# Patient Record
Sex: FEMALE | Race: WHITE
[De-identification: names, ages, dates, MRNs, and addresses within clinical notes are randomized per-mention and may not be internally consistent; named-entity substitution may affect disease eponyms.]

---

## 2018-08-12 ENCOUNTER — HOSPITAL ENCOUNTER (EMERGENCY)
Dept: HOSPITAL 56 - MW.ED | Age: 23
Discharge: HOME | End: 2018-08-12
Payer: COMMERCIAL

## 2018-08-12 VITALS — SYSTOLIC BLOOD PRESSURE: 158 MMHG | DIASTOLIC BLOOD PRESSURE: 92 MMHG

## 2018-08-12 DIAGNOSIS — K04.7: Primary | ICD-10-CM

## 2018-08-12 PROCEDURE — 99282 EMERGENCY DEPT VISIT SF MDM: CPT

## 2018-08-12 NOTE — EDM.PDOC
ED HPI GENERAL MEDICAL PROBLEM





- General


Chief Complaint: ENT Problem


Stated Complaint: TOOTH PAIN


Time Seen by Provider: 08/12/18 17:07


Source of Information: Reports: Patient


History Limitations: Reports: No Limitations





- History of Present Illness


INITIAL COMMENTS - FREE TEXT/NARRATIVE: 


HISTORY AND PHYSICAL:





History of present illness:


patient is a 23-year-old female who presents to the emergency room with 

complaints of right upper dental pain. She states she has had chronic problems 

with the affected teeth and has been seen intermittently for pain management. 

She states she has made dentist appointment but has canceled them as usually 

her pain will resolve. Currently 13 weeks pregnant, has no current OB concerns.


she denies any fever, chills, chest pain, shortness of breath or cough. Denies 

any abdominal pain, nausea, vomiting, diarrhea or constipation.


Denies any chance of pregnancy.





Review of systems: 


As per history of present illness and below otherwise all systems reviewed and 

negative.





Past medical history: 


As per history of present illness and as reviewed below otherwise 

noncontributory.





Surgical history: 


As per history of present illness and as reviewed below otherwise 

noncontributory.





Social history: 


No reported history of drug or alcohol abuse.





Family history: 


As per history of present illness and as reviewed below otherwise 

noncontributory.





Physical exam:


General: Well-developed and well-nourished 23-year-old female. Alert and 

oriented. Nooxic appearing and in no acute distress.


HEENT: Atraumatic, normocephalic, pupils equal and reactive bilaterally, 

negative for conjunctival pallor or scleral icterus, mucous membranes moist, 

throat clear, neck supple, nontender, trachea midline. No drooling or trismus 

noted. No meningeal signs


Lungs: Clear to auscultation, breath sounds equal bilaterally, chest nontender.


Heart: S1S2, regular rate and rhythm without overt murmur


Abdomen: Soft, nondistended, nontender. Negative for masses or 

hepatosplenomegaly. Negative for costovertebral tenderness.


Pelvis: Stable nontender.


Genitourinary: Deferred.


Rectal: Deferred.


Skin: Intact, warm, dry. No lesions or rashes noted.


Extremities: Atraumatic, negative for cords or calf pain. Neurovascular 

unremarkable.


Neuro: Awake, alert, oriented. Cranial nerves II through XII unremarkable. 

Cerebellum unremarkable. Motor and sensory unremarkable throughout. Exam 

nonfocal.





Notes:


Discussed the importance of maintaining her dental appointments for further 

evaluation and management of her chronic dental pain. She voices understanding 

and is agreeable to plan of care. Denies any further questions at this time.





Diagnostics:


None





Therapeutics:


Dental Balls (Viscous Lidocaine/Hurricane Spray)





Prescription:


Clindamycin





Impression: 


Dental Abscess





Plan:


1. Please take the antibiotic as prescribed.


2. Tylenol as needed for pain management (only pain medication safe in pregnancy

). "Tooth Balls" have been given to you; apply along the gumline every 2-3 

hours as needed. Do not swallow these; external use only. 


3. Follow-up with a dentist for definitive care. Return to the ED as needed and 

as discussed.





Definitive disposition and diagnosis as appropriate pending reevaluation and 

review of above.





  ** Right Oral/Mouth


Pain Score (Numeric/FACES): 10





- Related Data


 Allergies











Allergy/AdvReac Type Severity Reaction Status Date / Time


 


amoxicillin [Amoxicillin] Allergy  Hives Verified 08/12/18 17:32


 


codeine Allergy  Nausea Verified 08/12/18 17:32


 


hydrocodone Allergy  Nausea Verified 08/12/18 17:32


 


Penicillins Allergy  Hives Verified 08/12/18 17:32











Home Meds: 


 Home Meds





Prenatal Vits #93/Iron Fum/FA [Prenatal Formula Tablet] 1 each PO DAILY 10/23/

15 [History]


Doxylamine/Pyridoxine HCl [Diclegis Dr 10-10 mg Tablet] 2 tab PO BEDTIME 08/12/ 18 [History]











Past Medical History





- Past Health History


Medical/Surgical History: Denies Medical/Surgical History





ED ROS ENT





- Review of Systems


Review Of Systems: ROS reveals no pertinent complaints other than HPI.





ED EXAM, ENT





- Physical Exam


Exam: See Below (See dictation)





Course





- Vital Signs


Last Recorded V/S: 


 Last Vital Signs











Temp  98.4 F   08/12/18 17:30


 


Pulse  104 H  08/12/18 17:30


 


Resp  18   08/12/18 17:30


 


BP  158/92 H  08/12/18 17:30


 


Pulse Ox  100   08/12/18 17:30














- Orders/Labs/Meds


Meds: 


Medications














Discontinued Medications














Generic Name Dose Route Start Last Admin





  Trade Name Freq  PRN Reason Stop Dose Admin


 


Benzocaine  2 each  08/12/18 17:10  08/12/18 17:43





  Hurricaine One 20%  MUCMEM  08/12/18 17:11  2 each





  ONETIME ONE   Administration





     





     





     





     


 


Lidocaine HCl  15 ml  08/12/18 17:10  08/12/18 17:43





  Xylocaine 2% Viscous  PO  08/12/18 17:11  15 ml





  ONETIME ONE   Administration





     





     





     





     














Departure





- Departure


Time of Disposition: 17:47


Disposition: Home, Self-Care 01


Clinical Impression: 


 Dental abscess








- Discharge Information


Instructions:  Dental Abscess, Easy-to-Read


Referrals: 


Alissa Desai NP [Primary Care Provider] - 


Forms:  ED Department Discharge


Additional Instructions: 


The following information is given to patients seen in the emergency department 

who are being discharged to home. This information is to outline your options 

for follow-up care. We provide all patients seen in our emergency department 

with a follow-up referral.





The need for follow-up, as well as the timing and circumstances, are variable 

depending upon the specifics of your emergency department visit.





If you don't have a primary care physician on staff, we will provide you with a 

referral. We always advise you to contact your personal physician following an 

emergency department visit to inform them of the circumstance of the visit and 

for follow-up with them and/or the need for any referrals to a consulting 

specialist.





The emergency department will also refer you to a specialist when appropriate. 

This referral assures that you have the opportunity for follow-up care with a 

specialist. All of these measure are taken in an effort to provide you with 

optimal care, which includes your follow-up.





Under all circumstances we always encourage you to contact your private 

physician who remains a resource for coordinating your care. When calling for 

follow-up care, please make the office aware that this follow-up is from your 

recent emergency room visit. If for any reason you are refused follow-up, 

please contact the [facility] Emergency 

Department at [phone] and asked to speak to the emergency department 

charge nurse.





[facility]


Primary Care


[address] [place] [id number]


Phone: [phone]


Fax: [phone]





1. Please take the antibiotic as prescribed.


2. Tylenol as needed for pain management (only pain medication safe in pregnancy

). "Tooth Balls" have been given to you; apply along the gumline every 2-3 

hours as needed. Do not swallow these; external use only. 


3. Follow-up with a dentist for definitive care. Return to the ED as needed and 

as discussed.

## 2019-02-01 ENCOUNTER — HOSPITAL ENCOUNTER (INPATIENT)
Dept: HOSPITAL 56 - MW.OB | Age: 24
LOS: 3 days | Discharge: HOME | DRG: 560 | End: 2019-02-04
Attending: OBSTETRICS & GYNECOLOGY | Admitting: OBSTETRICS & GYNECOLOGY
Payer: COMMERCIAL

## 2019-02-01 DIAGNOSIS — Z3A.38: ICD-10-CM

## 2019-02-01 DIAGNOSIS — F41.9: ICD-10-CM

## 2019-02-01 DIAGNOSIS — Z88.6: ICD-10-CM

## 2019-02-01 DIAGNOSIS — Z88.1: ICD-10-CM

## 2019-02-01 DIAGNOSIS — Z88.0: ICD-10-CM

## 2019-02-01 DIAGNOSIS — Z87.891: ICD-10-CM

## 2019-02-01 DIAGNOSIS — Z88.5: ICD-10-CM

## 2019-02-01 PROCEDURE — 3E0R3BZ INTRODUCTION OF ANESTHETIC AGENT INTO SPINAL CANAL, PERCUTANEOUS APPROACH: ICD-10-PCS

## 2019-02-01 PROCEDURE — 00HU33Z INSERTION OF INFUSION DEVICE INTO SPINAL CANAL, PERCUTANEOUS APPROACH: ICD-10-PCS

## 2019-02-01 RX ADMIN — MAGNESIUM SULFATE IN WATER SCH MLS/HR: 40 INJECTION, SOLUTION INTRAVENOUS at 19:00

## 2019-02-01 NOTE — PCM.PREANE
Preanesthetic Assessment





- Anesthesia/Transfusion/Family Hx


Anesthesia History: Prior Anesthesia Without Reaction (D+C x 2, neck lymph nodes

, labor epidural:  GA and epidural without issues or problems)


Other Type of Anesthesia Reaction Comment: MOTHER DENIES ANY HX OF ANESTHESIA 

PROBLEMS


Family History of Anesthesia Reaction: No


Transfusion History: No Prior Transfusion(s)





- Review of Systems


General: No Symptoms (pre eclampsia, on magnesium sulfate drip.   37.6 weeks

)


Pulmonary: No Symptoms (asthma with sheezing in the past. No history of asthma 

attacks.  Last wheezing )


Cardiovascular: No Symptoms (pre eclampsia)


Gastrointestinal: No Symptoms (nausea during pregnancy treated with Vit B6 pills

)


Neurological: No Symptoms (sciatica on right side on and off--none for 2 months 

now. Worse when sangeeta to  son)


Other: Reports: None





- Physical Assessment


NPO Status Date: 19


NPO Status Time: 12:00 (food at noon. water and ice since then)


Pulse: 97 (fht 127)


O2 Sat by Pulse Oximetry: 100


Respiratory Rate: 22


Blood Pressure: 133/83


Temperature: 97.7 C


Height: 1.52 m


Weight: 79.832 kg


ASA Class: 3E


Mental Status: Alert & Oriented x3


Airway Class: Mallampati = 2


Dentition: Reports: Normal Dentition


Thyro-Mental Finger Breadths: 2


Mouth Opening Finger Breadths: 3 (tongue stud)


ROM/Head Extension: Full


Lungs: Clear to Auscultation, Normal Respiratory Effort


Cardiovascular: Regular Rate, Regular Rhythm





- Lab


Values: 





 Laboratory Last Values











WBC  13.21 K/uL (4.0-11.0)  H  19  18:45    


 


RBC  4.22 M/uL (4.30-5.90)  L  19  18:45    


 


Hgb  11.1 g/dL (12.0-16.0)  L  19  18:45    


 


Hct  34.7 % (36.0-46.0)  L  19  18:45    


 


MCV  82.2 fL (80.0-98.0)   19  18:45    


 


MCH  26.3 pg (27.0-32.0)  L  19  18:45    


 


MCHC  32.0 g/dL (31.0-37.0)   19  18:45    


 


RDW Std Deviation  47.1 fl (28.0-62.0)   19  18:45    


 


RDW Coeff of Rigoberto  16 % (11.0-15.0)  H  19  18:45    


 


Plt Count  237 K/uL (150-400)   19  18:45    


 


MPV  10.40 fL (7.40-12.00)   19  18:45    


 


Nucleated RBC %  0.0 /100WBC  19  18:45    


 


Nucleated RBCs #  0 K/uL  19  18:45    


 


Magnesium  2.0 mg/dL (1.8-2.4)   19  18:45    


 


Blood Type  B NEGATIVE   19  18:45    


 


Antibody Screen  NEGATIVE   19  18:45    














- Allergies


Allergies/Adverse Reactions: 


 Allergies











Allergy/AdvReac Type Severity Reaction Status Date / Time


 


amoxicillin [Amoxicillin] Allergy  Hives Verified 19 22:49


 


codeine Allergy  Nausea Verified 19 22:49


 


hydrocodone Allergy  Nausea Verified 19 22:49


 


Penicillins Allergy  Hives Verified 19 22:49














- Blood


Blood Available: No


Product(s) Available: None





- Anesthesia Plan


Pre-Op Medication Ordered: None





- Acknowledgements


Anesthesia Type Planned: Epidural (Plan:  labor epidural. Discussed with 

patient and baby daddy and mom and dad and grandma and grandpa.  all questions 

answered. consent signed)


Pt an Appropriate Candidate for the Planned Anesthesia: Yes


Alternatives and Risks of Anesthesia Discussed w Pt/Guardian: Yes


Pt/Guardian Understands and Agrees with Anesthesia Plan: Yes





PreAnesthesia Questionnaire





- Past Health History


Medical/Surgical History: Denies Medical/Surgical History


OB/GYN History: Reports: Pregnancy, Spontaneous , Other (See Below)


Other OB/BYN History: D&C 2014 and 2017


Psychiatric History: Reports: Anxiety





- Past Surgical History


HEENT Surgical History: Reports: Other (See Below)


Other HEENT Surgeries/Procedures: Neck Surgery 2011


Female  Surgical History: Reports: D&C





- SUBSTANCE USE


Smoking Status *Q: Former Smoker


Tobacco Use Within Last Twelve Months: Cigarettes


Second Hand Smoke Exposure: Yes


Recreational Drug Use History: No





- HOME MEDS


Home Medications: 


 Home Meds





Prenatal Vits #93/Iron Fum/FA [Prenatal Formula Tablet] 1 each PO DAILY 10/23/

15 [History]


Doxylamine/Pyridoxine HCl [Diclegis Dr 10-10 mg Tablet] 2 tab PO BEDTIME  [History]











- CURRENT (IN HOUSE) MEDS


Current Meds: 





 Current Medications





Butorphanol Tartrate (Stadol)  1 mg IVPUSH ASDIRECTED PRN


   PRN Reason: Pain


Calcium Gluconate (Calcium Gluconate)  1 gm IV ASDIRECTED PRN


   PRN Reason: respiratory distress


Carboprost Tromethamine (Hemabate Ds)  250 mcg IM ASDIRECTED PRN


   PRN Reason: Post Partum Hemorrhage


Lactated Ringer's (Ringers, Lactated)  1,000 mls @ 150 mls/hr IV ASDIRECTED CHRISTA


   Last Admin: 19 19:20 Dose:  150 mls/hr


Magnesium Sulfate (Magnesium Sulfate 40 Gm In Water 1000 Ml)  40 gm in 1,000 

mls @ 50 mls/hr IV ASDIRECTED CHRISTA; Protocol


Oxytocin/Sodium Chloride (Oxytocin 30 Unit/500 Ml-Ns)  30 unit in 500 mls @ 2 

mls/hr IV TITRATE CHRISTA; Protocol


   Last Titration: 19 21:39 Dose:  6 munits/min, 6 mls/hr


Oxytocin/Sodium Chloride (Oxytocin 30 Unit/500 Ml-Ns)  30 unit in 500 mls @ 999 

mls/hr IV TITRATE CHRISTA


Tranexamic Acid 1,000 mg/ (Sodium Chloride)  110 mls @ 660 mls/hr IV ONETIME PRN


   PRN Reason: Bleeding


Lidocaine HCl (Xylocaine 1%)  50 ml INJECT ONETIME PRN


   PRN Reason: Laceration repair


Methylergonovine Maleate (Methergine)  0.2 mg IM ASDIRECTED PRN


   PRN Reason: Post Partum Hemorrhage


Misoprostol (Cytotec)  200 mcg PO ONETIME PRN


   PRN Reason: Post Partum Hemorrhage


Nalbuphine HCl (Nubain)  10 mg IVPUSH ASDIRECTED PRN


   PRN Reason: Pain (severe 7-10)


Pyridoxine HCl (Vitamin B6-Pyridoxine)  25 mg PO BEDTIME CHRITSA


Sterile Water (Sterile Water For Irrigation)  1,000 ml IRR ASDIRECTED PRN


   PRN Reason: delivery


Terbutaline Sulfate (Brethine)  0.25 mg SUBCUT ASDIRECTED PRN


   PRN Reason: Tacysystole





Discontinued Medications





Magnesium Sulfate 4 gm/ Premix  100 mls @ 300 mls/hr IV BOLUS ONE


   Stop: 19 18:15


   Last Admin: 19 18:40 Dose:  300 mls/hr


Fentanyl/Bupivacaine HCl (Fentanyl-Bupiv-Ns 2 Mcg/Ml-0.125%) Confirm 

Administered Dose 100 mls @ as directed .ROUTE .STK-MED ONE


   Stop: 19 21:10


Lidocaine/Epinephrine (Lidocaine 1.5%-Epi 1:200,000) Confirm Administered Dose 

10 ml IJ .STK-MED ONE


   Stop: 19 21:11

## 2019-02-02 PROCEDURE — 6A550ZT PHERESIS OF CORD BLOOD STEM CELLS, SINGLE: ICD-10-PCS | Performed by: OBSTETRICS & GYNECOLOGY

## 2019-02-02 PROCEDURE — 10907ZC DRAINAGE OF AMNIOTIC FLUID, THERAPEUTIC FROM PRODUCTS OF CONCEPTION, VIA NATURAL OR ARTIFICIAL OPENING: ICD-10-PCS | Performed by: OBSTETRICS & GYNECOLOGY

## 2019-02-02 PROCEDURE — 3E033VJ INTRODUCTION OF OTHER HORMONE INTO PERIPHERAL VEIN, PERCUTANEOUS APPROACH: ICD-10-PCS | Performed by: OBSTETRICS & GYNECOLOGY

## 2019-02-02 RX ADMIN — MAGNESIUM SULFATE IN WATER SCH MLS/HR: 40 INJECTION, SOLUTION INTRAVENOUS at 22:06

## 2019-02-02 NOTE — [id number]
Post Anesthesia Note





- EVALUATION WITHIN 48HRS OF ANESTHETIC


Vital Signs in Normal Range: Yes


Patient Participated in Evaluation: Yes


Respiratory Function Stable: Yes


Airway Patent: Yes


Cardiovascular Function Stable: Yes


Hydration Status Stable: Yes


Pain Control Satisfactory: Yes


Nausea and Vomiting Control Satisfactory: Yes


Mental Status Recovered: Yes


Pulse Rate: 97 (fht 127)


Resp Rate: 20


Temperature: 97.7 C


Blood Pressure: 137/85





- COMMENTS/OBSERVATIONS


Free Text/Narrative:: 





delivered earlier this morning.  I was called in at 0600 to change the epidural 

infusion bag.  I subsequently re-bolused her for the delivery an hour later.


KERI Michel MD

## 2019-02-02 NOTE — PCM.DEL
L & D Note





- General Info


Date of Service: 19


Mother's Due Date: 19





- Delivery Note


Labor: Augmented by ARM, Induced by Oxytocin


Delivery Outcome: Livebirth


Infant Delivery Method: Spontaneous Vaginal Delivery-Single


Nuchal Cord: Present


Prep: Other


Anesthesia Type: Epidural


Episiotomy Type: None


Laceration: None


Placenta: Intact, Spontaneous


Cord: 3 Vessels


Estimated Blood Loss: 200


Resuscitation Needed: No


APGAR Score 1 min: 7


APGAR Score 5 min: 9


Second Stage Interventions: Reports: Encouragement Given, Pushing, Feet in Foot 

Rests (Liveborn male APGAR  weight 3030 grams. )





- General Info


Date of Service: 19





- Patient Data


Vitals - Most Recent: 


 Last Vital Signs











Temp  97.7 C H  19 22:08


 


Pulse  97   19 22:08


 


Resp  22 H  19 22:08


 


BP  133/83   19 22:08


 


Pulse Ox  100   19 22:08











Weight - Most Recent: 79.832 kg


I&O - Last 24 Hours: 


 Intake & Output











 19





 22:59 06:59 14:59


 


Intake Total 1140 706 140


 


Output Total 420 245 140


 


Balance 720 461 0











Lab Results Last 24 Hours: 


 Laboratory Results - last 24 hr











  19 Range/Units





  18:45 18:45 18:45 


 


WBC  13.21 H    (4.0-11.0)  K/uL


 


RBC  4.22 L    (4.30-5.90)  M/uL


 


Hgb  11.1 L    (12.0-16.0)  g/dL


 


Hct  34.7 L    (36.0-46.0)  %


 


MCV  82.2    (80.0-98.0)  fL


 


MCH  26.3 L    (27.0-32.0)  pg


 


MCHC  32.0    (31.0-37.0)  g/dL


 


RDW Std Deviation  47.1    (28.0-62.0)  fl


 


RDW Coeff of Rigoberto  16 H    (11.0-15.0)  %


 


Plt Count  237    (150-400)  K/uL


 


MPV  10.40    (7.40-12.00)  fL


 


Nucleated RBC %  0.0    /100WBC


 


Nucleated RBCs #  0    K/uL


 


Magnesium    2.0  (1.8-2.4)  mg/dL


 


Blood Type   B NEGATIVE   


 


Antibody Screen   NEGATIVE   














  19 Range/Units





  23:07 05:57 


 


WBC    (4.0-11.0)  K/uL


 


RBC    (4.30-5.90)  M/uL


 


Hgb    (12.0-16.0)  g/dL


 


Hct    (36.0-46.0)  %


 


MCV    (80.0-98.0)  fL


 


MCH    (27.0-32.0)  pg


 


MCHC    (31.0-37.0)  g/dL


 


RDW Std Deviation    (28.0-62.0)  fl


 


RDW Coeff of Rigoberto    (11.0-15.0)  %


 


Plt Count    (150-400)  K/uL


 


MPV    (7.40-12.00)  fL


 


Nucleated RBC %    /100WBC


 


Nucleated RBCs #    K/uL


 


Magnesium  5.1 H  6.4 H  (1.8-2.4)  mg/dL


 


Blood Type    


 


Antibody Screen    











Med Orders - Current: 


 Current Medications





Butorphanol Tartrate (Stadol)  1 mg IVPUSH ASDIRECTED PRN


   PRN Reason: Pain


Calcium Gluconate (Calcium Gluconate)  1 gm IV ASDIRECTED PRN


   PRN Reason: respiratory distress


Carboprost Tromethamine (Hemabate Ds)  250 mcg IM ASDIRECTED PRN


   PRN Reason: Post Partum Hemorrhage


Lactated Ringer's (Ringers, Lactated)  1,000 mls @ 150 mls/hr IV ASDIRECTED CHRISTA


   Last Admin: 19 19:20 Dose:  150 mls/hr


Magnesium Sulfate (Magnesium Sulfate 40 Gm In Water 1000 Ml)  40 gm in 1,000 

mls @ 50 mls/hr IV ASDIRECTED CHRISTA; Protocol


   Last Titration: 19 07:13 Dose:  1 gm/hr, 25 mls/hr


Oxytocin/Sodium Chloride (Oxytocin 30 Unit/500 Ml-Ns)  30 unit in 500 mls @ 2 

mls/hr IV TITRATE CHRISTA; Protocol


   Last Titration: 19 07:32 Dose:  11 munits/min, 11 mls/hr


Oxytocin/Sodium Chloride (Oxytocin 30 Unit/500 Ml-Ns)  30 unit in 500 mls @ 999 

mls/hr IV TITRATE CHRISTA


Tranexamic Acid 1,000 mg/ (Sodium Chloride)  110 mls @ 660 mls/hr IV ONETIME PRN


   PRN Reason: Bleeding


Lidocaine HCl (Xylocaine 1%)  50 ml INJECT ONETIME PRN


   PRN Reason: Laceration repair


Methylergonovine Maleate (Methergine)  0.2 mg IM ASDIRECTED PRN


   PRN Reason: Post Partum Hemorrhage


Misoprostol (Cytotec)  200 mcg PO ONETIME PRN


   PRN Reason: Post Partum Hemorrhage


Nalbuphine HCl (Nubain)  10 mg IVPUSH ASDIRECTED PRN


   PRN Reason: Pain (severe 7-10)


Ondansetron HCl (Zofran)  4 mg IVPUSH Q6H PRN


   PRN Reason: Nausea/Vomiting


   Last Admin: 19 00:37 Dose:  4 mg


Pyridoxine HCl (Vitamin B6-Pyridoxine)  25 mg PO BEDTIME CHRISTA


   Last Admin: 19 22:38 Dose:  25 mg


Sterile Water (Sterile Water For Irrigation)  1,000 ml IRR ASDIRECTED PRN


   PRN Reason: delivery


Terbutaline Sulfate (Brethine)  0.25 mg SUBCUT ASDIRECTED PRN


   PRN Reason: Tacysystole





Discontinued Medications





Magnesium Sulfate 4 gm/ Premix  100 mls @ 300 mls/hr IV BOLUS ONE


   Stop: 19 18:15


   Last Admin: 19 18:40 Dose:  300 mls/hr


Fentanyl/Bupivacaine HCl (Fentanyl-Bupiv-Ns 2 Mcg/Ml-0.125%) Confirm 

Administered Dose 100 mls @ as directed .ROUTE [employer]MED ONE


   Stop: 19 21:10


Lidocaine HCl (Xylocaine-Mpf 1%) Confirm Administered Dose 5 mls @ as directed 

.ROUTE .STK-MED ONE


   Stop: 19 05:59


Fentanyl/Bupivacaine HCl (Fentanyl-Bupiv-Ns 2 Mcg/Ml-0.125%) Confirm 

Administered Dose 100 mls @ as directed .ROUTE .STK-MED ONE


   Stop: 19 06:03


Lidocaine/Epinephrine (Lidocaine 1.5%-Epi 1:200,000) Confirm Administered Dose 

10 ml IJ .[employer]-MED ONE


   Stop: 19 21:11











- Problem List & Annotations


(1) Pre-eclampsia, delivered


SNOMED Code(s): 075028100, 169041025


   Code(s): O14.94 - UNSPECIFIED PRE-ECLAMPSIA, COMPLICATING CHILDBIRTH   Status

: Acute   Current Visit: Yes   





(2) Vaginal delivery


SNOMED Code(s): 332727842


   Code(s): O80 - ENCOUNTER FOR FULL-TERM UNCOMPLICATED DELIVERY   Status: 

Acute   Current Visit: Yes   





- Problem List Review


Problem List Initiated/Reviewed/Updated: Yes





- My Orders


Last 24 Hours: 


My Active Orders





19 17:56


Butorphanol [Stadol]   1 mg IVPUSH ASDIRECTED PRN 


Calcium Gluconate   1 gm IV ASDIRECTED PRN 


Carboprost Tromethamine [Hemabate DS]   250 mcg IM ASDIRECTED PRN 


Lidocaine 1% [Xylocaine 1%]   50 ml INJECT ONETIME PRN 


Methylergonovine [Methergine]   0.2 mg IM ASDIRECTED PRN 


Nalbuphine [Nubain]   10 mg IVPUSH ASDIRECTED PRN 


Terbutaline [Brethine]   0.25 mg SUBCUT ASDIRECTED PRN 


Tranexamic Acid [Cyklokapron] 1,000 mg   Sodium Chloride 0.9% [Normal Saline] 

100 ml IV ONETIME 


Water For Irrigation,Sterile [Sterile Water for Irrigation]   1,000 ml IRR 

ASDIRECTED PRN 


miSOPROStol [Cytotec]   200 mcg PO ONETIME PRN 


Resuscitation Status Routine 





19 18:00


Lactated Ringers [Ringers, Lactated] 1,000 ml IV ASDIRECTED 


Magnesium Sulfate/Water [Magnesium Sulfate 40 GM in Water 1000 ML] 40 gm in 1,

000 ml IV ASDIRECTED 


Oxytocin/0.9 % Sodium Chloride [Oxytocin 30 Unit/500 ML-NS] 30 unit in 500 ml 

IV TITRATE 


Oxytocin/0.9 % Sodium Chloride [Oxytocin 30 Unit/500 ML-NS] 30 unit in 500 ml 

IV TITRATE 





19 18:06


Patient Status [ADT] Routine 


Bedrest Bathroom Privileges [RC] ASDIRECTED 


Bedrest [RC] ASDIRECTED 


Communication Order [RC] ASDIRECTED 


Communication Order [RC] ASDIRECTED 


Communication Order [RC] PRN 


Communication Order [RC] PRN 


Height and Weight [RC] DAILY 


Intake and Output [RC] QSHIFT 


Notify Provider [RC] PRN 


Notify Provider [RC] STAT 


Oxygen Therapy [RC] ASDIRECTED 


Vaginal Exam [RC] PRN 


Vital Signs [RC] ASDIRECTED 


Vital Signs [RC] PER UNIT ROUTINE 


Electronic Fetal Heart Tones Ext w TOCO [WOMSER] Per Unit Routine 


Fetal Scalp Electrode [WOMSER] Per Unit Routine 


Peripheral IV Insertion Adult [OM.PC] Routine 


Peripheral IV Insertion Adult [OM.PC] Routine 





19 18:08


Equipment to Bedside [RC] PRN 


Notify Provider Status Change [RC] ASDIRECTED 





19 18:15


Deep Tendon Reflexes [WOMSER] Q1H 


Medication Administration Instruction [OM.PC] Q3H 





19 19:15


Deep Tendon Reflexes [WOMSER] Q1H 





19 20:15


Deep Tendon Reflexes [WOMSER] Q1H 





19 21:00


Vitamin B6-pyridOXINE   25 mg PO BEDTIME 





19 21:15


Deep Tendon Reflexes [WOMSER] Q1H 





19 22:15


Deep Tendon Reflexes [WOMSER] Q1H 





19 23:15


Deep Tendon Reflexes [WOMSER] Q1H 





19 00:15


Deep Tendon Reflexes [WOMSER] Q1H 





19 00:25


Ondansetron [Zofran]   4 mg IVPUSH Q6H PRN 





19 01:15


Deep Tendon Reflexes [WOMSER] Q1H 





19 02:15


Deep Tendon Reflexes [WOMSER] Q1 





19 03:15


Deep Tendon Reflexes [WOMSER] Q1 





19 04:15


Deep Tendon Reflexes [WOMSER] Q1 





19 05:15


Deep Tendon Reflexes [WOMSER] Q1 





19 06:15


Deep Tendon Reflexes [WOMSER] Q1 





19 07:15


Deep Tendon Reflexes [WOMSER] Q1 





19 08:15


Deep Tendon Reflexes [WOMSER] Q1 





19 09:15


Deep Tendon Reflexes [WOMSER] Q1 





19 10:15


Deep Tendon Reflexes [WOMSER] Q1 





19 11:15


Deep Tendon Reflexes [WOMSER] Q1H 





19 11:45


MAGNESIUM [CHEM] Q6H 





19 12:15


Deep Tendon Reflexes [WOMSER] Q1H 





19 13:15


Deep Tendon Reflexes [WOMSER] Q1H 





19 14:15


Deep Tendon Reflexes [WOMSER] Q1H 





19 15:15


Deep Tendon Reflexes [WOMSER] Q1H 





19 16:15


Deep Tendon Reflexes [WOMSER] Q1 





19 17:15


Deep Tendon Reflexes [WOMSER] Q1H 





19 17:45


MAGNESIUM [CHEM] Q6H 





19 23:45


MAGNESIUM [CHEM] Q6H 





19 05:45


MAGNESIUM [CHEM] Q6H

## 2019-02-03 NOTE — PCM.PNPP
- General Info


Date of Service: 19


Functional Status: Reports: Pain Controlled, Tolerating Diet, Ambulating, 

Urinating





- Review of Systems


General: Reports: No Symptoms


HEENT: Reports: No Symptoms


Pulmonary: Reports: No Symptoms


Cardiovascular: Reports: No Symptoms


Gastrointestinal: Reports: No Symptoms


Genitourinary: Reports: No Symptoms


Musculoskeletal: Reports: No Symptoms


Skin: Reports: No Symptoms


Neurological: Reports: No Symptoms


Psychiatric: Reports: No Symptoms





- General Info


Date of Service: 19





- Patient Data


Vital Signs - Most Recent: 


 Last Vital Signs











Temp  36.6 C   19 04:50


 


Pulse  86   19 04:50


 


Resp  16   19 04:50


 


BP  122/64   19 04:50


 


Pulse Ox  97   19 04:50











Weight - Most Recent: 79.832 kg


I&O - Last 24 Hours: 


 Intake & Output











 19





 22:59 06:59 14:59


 


Intake Total 280 506 


 


Output Total 717 675 


 


Balance -437 -169 











Lab Results - Last 24 Hours: 


 Laboratory Results - last 24 hr











  19 Range/Units





  07:53 10:31 11:58 


 


Hgb     (12.0-16.0)  g/dL


 


Hct     (36.0-46.0)  %


 


Cord ABG pH  7.314    (7.18-7.38)  


 


Cord ABG Base Excess  -6    (-10--2)  


 


Cord VBG pH  7.397    (7.25-7.45)  


 


Cord VBG Base Excess  -6    (-10--2)  


 


Magnesium    5.0 H  (1.8-2.4)  mg/dL


 


Fetal Screen   NEGATIVE   (NEGATIVE)  


 


RhIG Candidate?   YES   


 


Rhogam Indicated   YES, BABY RH POS H   














  19 Range/Units





  17:43 05:54 


 


Hgb   9.9 L  (12.0-16.0)  g/dL


 


Hct   31.6 L  (36.0-46.0)  %


 


Cord ABG pH    (7.18-7.38)  


 


Cord ABG Base Excess    (-10--2)  


 


Cord VBG pH    (7.25-7.45)  


 


Cord VBG Base Excess    (-10--2)  


 


Magnesium  5.1 H   (1.8-2.4)  mg/dL


 


Fetal Screen    (NEGATIVE)  


 


RhIG Candidate?    


 


Rhogam Indicated    











Med Orders - Current: 


 Current Medications





Acetaminophen (Tylenol Extra Strength)  500 mg PO Q4H PRN


   PRN Reason: Pain


Acetaminophen (Tylenol Extra Strength)  1,000 mg PO Q4H PRN


   PRN Reason: Pain


   Last Admin: 19 02:48 Dose:  1,000 mg


Benzocaine/Menthol (Dermoplast Pain Relief 20%-0.5% Spray)  78 gm TOP 

ASDIRECTED PRN


   PRN Reason: Perineal Comfort Measure


Bisacodyl (Dulcolax)  10 mg RECTAL ONETIME PRN


   PRN Reason: Constipation


Butorphanol Tartrate (Stadol)  1 mg IVPUSH ASDIRECTED PRN


   PRN Reason: Pain


Carboprost Tromethamine (Hemabate Ds)  250 mcg IM ASDIRECTED PRN


   PRN Reason: Excessive vaginal bleeding


Docusate Sodium (Colace)  100 mg PO BID PRN


   PRN Reason: Constipation


   Last Admin: 19 09:28 Dose:  100 mg


Emollient Ointment (Lansinoh Hpa)  0 gm TOP ASDIRECTED PRN


   PRN Reason: Sore Nipples


   Last Admin: 19 00:01 Dose:  1 tube


Lactated Ringer's (Ringers, Lactated)  1,000 mls @ 150 mls/hr IV ASDIRECTED CHRISTA


   Last Admin: 19 19:20 Dose:  150 mls/hr


Ibuprofen (Motrin)  400 mg PO Q4H PRN


   PRN Reason: Pain


   Last Admin: 19 05:04 Dose:  400 mg


Witch Hazel (Tucks)  1 pad TOP ASDIRECTED PRN


   PRN Reason: comfort care





Discontinued Medications





Calcium Gluconate (Calcium Gluconate)  1 gm IV ASDIRECTED PRN


   PRN Reason: respiratory distress


Carboprost Tromethamine (Hemabate Ds)  250 mcg IM ASDIRECTED PRN


   PRN Reason: Post Partum Hemorrhage


Magnesium Sulfate 4 gm/ Premix  100 mls @ 300 mls/hr IV BOLUS ONE


   Stop: 19 18:15


   Last Admin: 19 18:40 Dose:  300 mls/hr


Magnesium Sulfate (Magnesium Sulfate 40 Gm In Water 1000 Ml)  40 gm in 1,000 

mls @ 50 mls/hr IV ASDIRECTED CHRISTA; Protocol


   Last Admin: 19 22:06 Dose:  1 gm/hr, 25 mls/hr


Oxytocin/Sodium Chloride (Oxytocin 30 Unit/500 Ml-Ns)  30 unit in 500 mls @ 2 

mls/hr IV TITRATE CHRISTA; Protocol


   Last Titration: 19 07:32 Dose:  11 munits/min, 11 mls/hr


Oxytocin/Sodium Chloride (Oxytocin 30 Unit/500 Ml-Ns)  30 unit in 500 mls @ 999 

mls/hr IV TITRATE CHRISTA


Tranexamic Acid 1,000 mg/ (Sodium Chloride)  110 mls @ 660 mls/hr IV ONETIME PRN


   PRN Reason: Bleeding


Fentanyl/Bupivacaine HCl (Fentanyl-Bupiv-Ns 2 Mcg/Ml-0.125%) Confirm 

Administered Dose 100 mls @ as directed .ROUTE .[employer]-MED ONE


   Stop: 19 21:10


   Last Admin: 19 21:38 Dose:  Not Given


Lidocaine HCl (Xylocaine-Mpf 1%) Confirm Administered Dose 5 mls @ as directed 

.ROUTE .[employer]-MED ONE


   Stop: 19 05:59


Fentanyl/Bupivacaine HCl (Fentanyl-Bupiv-Ns 2 Mcg/Ml-0.125%) Confirm 

Administered Dose 100 mls @ as directed .ROUTE .[employer]


   Stop: 19 06:03


   Last Admin: 19 21:38 Dose:  Not Given


Lidocaine HCl (Xylocaine 1%)  50 ml INJECT ONETIME PRN


   PRN Reason: Laceration repair


Lidocaine/Epinephrine (Lidocaine 1.5%-Epi 1:200,000) Confirm Administered Dose 

10 ml IJ .[email or web address]MED ONE


   Stop: 19 21:11


   Last Admin: 19 21:38 Dose:  Not Given


Methylergonovine Maleate (Methergine)  0.2 mg IM ASDIRECTED PRN


   PRN Reason: Post Partum Hemorrhage


Misoprostol (Cytotec)  200 mcg PO ONETIME PRN


   PRN Reason: Post Partum Hemorrhage


Nalbuphine HCl (Nubain)  10 mg IVPUSH ASDIRECTED PRN


   PRN Reason: Pain (severe 7-10)


Ondansetron HCl (Zofran)  4 mg IVPUSH Q6H PRN


   PRN Reason: Nausea/Vomiting


   Last Admin: 19 00:37 Dose:  4 mg


Pyridoxine HCl (Vitamin B6-Pyridoxine)  25 mg PO BEDTIME CHRISTA


   Last Admin: 19 22:38 Dose:  25 mg


Sterile Water (Sterile Water For Irrigation)  1,000 ml IRR ASDIRECTED PRN


   PRN Reason: delivery


Terbutaline Sulfate (Brethine)  0.25 mg SUBCUT ASDIRECTED PRN


   PRN Reason: Tacysystole











- Infant Interaction


Infant Disposition, Postpartum:  in Room with Family


Infant Interaction: Holding Infant


Infant Feeding: Attempted Breastfeeding; Nursed Fair/Poor


Support Person: Significant Other





- Postpartum Recovery Exam


Fundal Tone: Boggy


Fundal Level: 1 Fingerbreadths Below Umbilicus


Fundal Placement: Midline


Lochia Amount: Scant


Lochia Color: Rubra/Red


Perineum Description: Intact, Minimal Bruising/Swelling


Episiotomy/Laceration: None


Bladder Status: Voiding


Urinary Elimination: Voided





- Exam


General: Alert, Oriented


Cardiovascular: Regular Rate, Regular Rhythm


GI/Abdominal Exam: Normal Bowel Sounds, Soft, Non-Tender, No Organomegaly, No 

Distention, No Abnormal Bruit, No Mass, Pelvis Stable


Skin: Warm, Dry, Intact


Neurological: No New Focal Deficit


Psy/Mental Status: Alert, Normal Affect, Normal Mood





- Problem List & Annotations


(1) Pre-eclampsia, delivered


SNOMED Code(s): 158453354, 208499692


   Code(s): O14.94 - UNSPECIFIED PRE-ECLAMPSIA, COMPLICATING CHILDBIRTH   Status

: Acute   Current Visit: Yes   





(2) Vaginal delivery


SNOMED Code(s): 658056330


   Code(s): O80 - ENCOUNTER FOR FULL-TERM UNCOMPLICATED DELIVERY   Status: 

Acute   Current Visit: Yes   





- Problem List Review


Problem List Initiated/Reviewed/Updated: Yes





- My Orders


Last 24 Hours: 


My Active Orders





19 08:15


Patient Status [ADT] Routine 


May Shower [RC] ASDIRECTED 


Up ad Lamar [RC] ASDIRECTED 


Vital Signs [RC] PER UNIT ROUTINE 


Acetaminophen [Tylenol Extra Strength]   1,000 mg PO Q4H PRN 


Acetaminophen [Tylenol Extra Strength]   500 mg PO Q4H PRN 


Benzocaine/Menthol [Dermoplast Pain Relief 20%-0.5% Spray]   78 gm TOP 

ASDIRECTED PRN 


Bisacodyl [Dulcolax]   10 mg RECTAL ONETIME PRN 


Carboprost Tromethamine [Hemabate DS]   250 mcg IM ASDIRECTED PRN 


Docusate Sodium [Colace]   100 mg PO BID PRN 


Ibuprofen [Motrin]   400 mg PO Q4H PRN 


Lanolin [Lansinoh HPA]   See Dose Instructions  TOP ASDIRECTED PRN 


Witch Hazel [Tucks]   1 pad TOP ASDIRECTED PRN 


Assess Lochia [WOMSER] Per Unit Routine 


Assess Uterine Involution [WOMSER] Per Unit Routine 


Peripheral IV Discontinue [OM.PC] Routine 


Resuscitation Status Routine 





19 08:16


Perineal Care [OM.PC] Per Unit Routine 





19 Breakfast


Regular Diet [DIET] 














- Assessment


Assessment:: 


PPD#1 after  stable minimal lochia. Denies headache or blurred vision, 

Feeling well off of magnesium.








- Plan


Plan:: 


Bp's have been normal, continue with postpartum care, continue monitoring BP 

today, patient would like to stay until tomorrow if possible.

## 2019-02-04 VITALS — DIASTOLIC BLOOD PRESSURE: 79 MMHG | SYSTOLIC BLOOD PRESSURE: 127 MMHG

## 2019-02-04 NOTE — OR
SURGEON:

Christie Saldana M.D.

 

DATE OF DELIVERY:  2019

 

PREOPERATIVE DIAGNOSES:

1. A 38-week intrauterine pregnancy.

2. Mild preeclampsia.

 

POSTOPERATIVE DIAGNOSES:

1. A 38-week intrauterine pregnancy.

2. Mild preeclampsia.

 

PROCEDURES:

Magnesium seizure prophylaxis, Pitocin induction of labor, artificial rupture of

membranes, and term spontaneous vaginal delivery.

 

ANESTHESIA:

Epidural.

 

ESTIMATED BLOOD LOSS:

Less than 200 mL.

 

FINDINGS:

Liveborn male, Apgar scores 7 and 9, weighing 3030 g.  Placenta spontaneous,

Schultze intact with 3 vessels.  Perineum intact.

 

BRIEF HISTORY:

This is a 23-year-old female.  She is G3, P1-0-1-1.  She presents at 37 and 6/7

weeks' gestation for induction of labor due to elevated 24-hour urine of 364 mg,

mildly elevated blood pressures.  She denies headache or visual changes.  She

was placed on magnesium.  She was placed on Pitocin.  She had category 1 fetal

heart tones.  When she was 3 cm dilated, she had artificial rupture of

membranes.  Clear fluid noted.  She received an epidural for pain control.  She

was continued on Pitocin and magnesium until she progressed to complete.

 

DESCRIPTION OF PROCEDURE:

With the patient in dorsolithotomy position, the patient pushed over 30-minute

time period to a 5+ station.  At which time, the head was delivered

spontaneously and atraumatically over the perineum with support with subsequent

delivery of the infant's shoulders and body without any difficulty.  The infant

was bulb suctioned by nose and mouth.  Cord was clamped x2 and cut, and the

infant was handed to the mother in the presence of the nurse attending delivery.

The infant was a liveborn male, Apgar scores 7 and 9, weight of 3030 g.  Cord

blood was collected for cord ABGs, as well as routine cord blood sampling.

Pitocin was initiated after delivery of the infant to assist with delivery.  The

placenta, which was delivered spontaneously, Schultze intact with 3 vessels.

Upon inspection of the pelvis and perineum, there were no periurethral, vaginal

sidewall, cervical, rectal, or perineal lacerations.  EBL was less than 200 mL.

There were no known complications.  Mother and baby are in LDRP in good

condition.  She will be continued on magnesium seizure prophylaxis

postoperatively.

 

 

ALLAN / YULIANA

DD:  2019 08:39:25

DT:  2019 09:11:04

Job #:  [id number]

## 2019-02-04 NOTE — PCM.PNPP
- General Info


Date of Service: 19


Functional Status: Reports: Pain Controlled, Tolerating Diet, Ambulating, 

Urinating





- Review of Systems


General: Reports: No Symptoms


HEENT: Reports: No Symptoms


Pulmonary: Reports: No Symptoms


Cardiovascular: Reports: No Symptoms


Gastrointestinal: Reports: No Symptoms


Genitourinary: Reports: No Symptoms


Musculoskeletal: Reports: No Symptoms


Skin: Reports: No Symptoms


Neurological: Reports: No Symptoms


Psychiatric: Reports: No Symptoms





- Patient Data


Vital Signs - Most Recent: 


 Last Vital Signs











Temp  36.8 C   19 03:40


 


Pulse  86   19 03:40


 


Resp  16   19 03:40


 


BP  122/79   19 03:40


 


Pulse Ox  98   19 03:40











Weight - Most Recent: 79.832 kg


Med Orders - Current: 


 Current Medications





Acetaminophen (Tylenol Extra Strength)  500 mg PO Q4H PRN


   PRN Reason: Pain


Acetaminophen (Tylenol Extra Strength)  1,000 mg PO Q4H PRN


   PRN Reason: Pain


   Last Admin: 19 12:06 Dose:  1,000 mg


Benzocaine/Menthol (Dermoplast Pain Relief 20%-0.5% Spray)  78 gm TOP 

ASDIRECTED PRN


   PRN Reason: Perineal Comfort Measure


Bisacodyl (Dulcolax)  10 mg RECTAL ONETIME PRN


   PRN Reason: Constipation


Butorphanol Tartrate (Stadol)  1 mg IVPUSH ASDIRECTED PRN


   PRN Reason: Pain


Carboprost Tromethamine (Hemabate Ds)  250 mcg IM ASDIRECTED PRN


   PRN Reason: Excessive vaginal bleeding


Docusate Sodium (Colace)  100 mg PO BID PRN


   PRN Reason: Constipation


   Last Admin: 19 09:28 Dose:  100 mg


Emollient Ointment (Lansinoh Hpa)  0 gm TOP ASDIRECTED PRN


   PRN Reason: Sore Nipples


   Last Admin: 19 00:01 Dose:  1 tube


Lactated Ringer's (Ringers, Lactated)  1,000 mls @ 150 mls/hr IV ASDIRECTED CHRISTA


   Last Admin: 19 19:20 Dose:  150 mls/hr


Ibuprofen (Motrin)  400 mg PO Q4H PRN


   PRN Reason: Pain


   Last Admin: 19 03:48 Dose:  400 mg


Witch Hazel (Tucks)  1 pad TOP ASDIRECTED PRN


   PRN Reason: comfort care





Discontinued Medications





Calcium Gluconate (Calcium Gluconate)  1 gm IV ASDIRECTED PRN


   PRN Reason: respiratory distress


Carboprost Tromethamine (Hemabate Ds)  250 mcg IM ASDIRECTED PRN


   PRN Reason: Post Partum Hemorrhage


Magnesium Sulfate 4 gm/ Premix  100 mls @ 300 mls/hr IV BOLUS ONE


   Stop: 19 18:15


   Last Admin: 19 18:40 Dose:  300 mls/hr


Magnesium Sulfate (Magnesium Sulfate 40 Gm In Water 1000 Ml)  40 gm in 1,000 

mls @ 50 mls/hr IV ASDIRECTED CHRISTA; Protocol


   Last Admin: 19 22:06 Dose:  1 gm/hr, 25 mls/hr


Oxytocin/Sodium Chloride (Oxytocin 30 Unit/500 Ml-Ns)  30 unit in 500 mls @ 2 

mls/hr IV TITRATE CHRISTA; Protocol


   Last Titration: 19 07:32 Dose:  11 munits/min, 11 mls/hr


Oxytocin/Sodium Chloride (Oxytocin 30 Unit/500 Ml-Ns)  30 unit in 500 mls @ 999 

mls/hr IV TITRATE CHRISTA


Tranexamic Acid 1,000 mg/ (Sodium Chloride)  110 mls @ 660 mls/hr IV ONETIME PRN


   PRN Reason: Bleeding


Fentanyl/Bupivacaine HCl (Fentanyl-Bupiv-Ns 2 Mcg/Ml-0.125%) Confirm 

Administered Dose 100 mls @ as directed .ROUTE [employer]


   Stop: 19 21:10


   Last Admin: 19 21:38 Dose:  Not Given


Lidocaine HCl (Xylocaine-Mpf 1%) Confirm Administered Dose 5 mls @ as directed 

.ROUTE .[employer]-MED ONE


   Stop: 19 05:59


Fentanyl/Bupivacaine HCl (Fentanyl-Bupiv-Ns 2 Mcg/Ml-0.125%) Confirm 

Administered Dose 100 mls @ as directed .ROUTE [employer]


   Stop: 19 06:03


   Last Admin: 19 21:38 Dose:  Not Given


Lidocaine HCl (Xylocaine 1%)  50 ml INJECT ONETIME PRN


   PRN Reason: Laceration repair


Lidocaine/Epinephrine (Lidocaine 1.5%-Epi 1:200,000) Confirm Administered Dose 

10 ml IJ .[facility]-MED ONE


   Stop: 19 21:11


   Last Admin: 19 21:38 Dose:  Not Given


Methylergonovine Maleate (Methergine)  0.2 mg IM ASDIRECTED PRN


   PRN Reason: Post Partum Hemorrhage


Misoprostol (Cytotec)  200 mcg PO ONETIME PRN


   PRN Reason: Post Partum Hemorrhage


Nalbuphine HCl (Nubain)  10 mg IVPUSH ASDIRECTED PRN


   PRN Reason: Pain (severe 7-10)


Ondansetron HCl (Zofran)  4 mg IVPUSH Q6H PRN


   PRN Reason: Nausea/Vomiting


   Last Admin: 19 00:37 Dose:  4 mg


Pyridoxine HCl (Vitamin B6-Pyridoxine)  25 mg PO BEDTIME CHRISTA


   Last Admin: 19 22:38 Dose:  25 mg


Sterile Water (Sterile Water For Irrigation)  1,000 ml IRR ASDIRECTED PRN


   PRN Reason: delivery


Terbutaline Sulfate (Brethine)  0.25 mg SUBCUT ASDIRECTED PRN


   PRN Reason: Tacysystole











- Infant Interaction


Infant Disposition, Postpartum: [place] in Room with Family


Infant Interaction: Holding Infant


Infant Feeding: Attempted Breastfeeding; Nursed Fair/Poor


Support Person: Significant Other





- Postpartum Recovery Exam


Fundal Tone: Firm


Fundal Level: 1 Fingerbreadths Below Umbilicus


Fundal Placement: Midline


Lochia Amount: Scant


Lochia Color: Rubra/Red


Perineum Description: Intact, Minimal Bruising/Swelling


Episiotomy/Laceration: None


Bladder Status: Voiding


Urinary Elimination: Voided





- Exam


General: Alert, Oriented


Neck: Supple


Lungs: Clear to Auscultation, Normal Respiratory Effort


Cardiovascular: Regular Rate, Regular Rhythm


GI/Abdominal Exam: Normal Bowel Sounds, Soft, Non-Tender, No Organomegaly, No 

Distention


Extremities: Normal Inspection, Normal Range of Motion, Non-Tender, No Pedal 

Edema, Normal Capillary Refill


Skin: Warm, Dry, Intact


Neurological: No New Focal Deficit


Psy/Mental Status: Alert, Normal Affect, Normal Mood





- Problem List & Annotations


(1) Pre-eclampsia, delivered


SNOMED Code(s): 919293552, 397070328


   Code(s): O14.94 - UNSPECIFIED PRE-ECLAMPSIA, COMPLICATING CHILDBIRTH   Status

: Acute   Current Visit: Yes   





(2) Vaginal delivery


SNOMED Code(s): 440282841


   Code(s): O80 - ENCOUNTER FOR FULL-TERM UNCOMPLICATED DELIVERY   Status: 

Acute   Current Visit: Yes   





- Problem List Review


Problem List Initiated/Reviewed/Updated: Yes





- Assessment


Assessment:: 


PPD#2 after  stable minimal lochia. blood pressures have been normal off of 

magnesium, Would like to go home today.





- Plan


Plan:: 


Dismiss to home with BP check in clinic this week.  Discharge instructions 

reviewed.

## 2020-07-18 NOTE — EDM.PDOC
ED HPI GENERAL MEDICAL PROBLEM





- General


Chief Complaint: General


Stated Complaint: TOOTH PAIN; FACIAL SWELLING


Time Seen by Provider: 20 11:06





- History of Present Illness


INITIAL COMMENTS - FREE TEXT/NARRATIVE: 





History of present illness:


25-year-old female presenting with left upper dental pain ongoing for the last 3

days.  Apparently she saw her dentist 2-1/2 days ago for an emergency 

appointment and she was prescribed clindamycin which she has been taking, 

however she has been having ongoing pain.  She has been taking 500 to 1000 mg of

Tylenol and 400 mg of ibuprofen without improvement.  No fevers.  She did report

that she developed some pain moving up into her upper jaw and she felt that 

there may be some swelling developing in this area as well.  No difficulty 

opening or closing in the mouth and no difficulty talking or chewing.





Review of systems: 


As per history of present illness and below otherwise all systems reviewed and 

negative.





Past medical history: 


As per history of present illness and as reviewed below otherwise 

noncontributory.





Surgical history: 


As per history of present illness and as reviewed below otherwise 

noncontributory.





Social history: 


No reported history of drug or alcohol abuse.  No tobacco





Family history: 


As per history of present illness and as reviewed below otherwise 

noncontributory.





Physical exam:


GEN: no acute distress, well appearing


HEENT: Atraumatic, normocephalic, mucous membranes moist, gum erythema and mild 

swelling over left maxillary teeth/jaw.  There are several areas of dental decay

in the left maxillary molars.  No pointing abscess seen.  No facial cellulitis 

or erythema.  Minimally tender to touch over the left maxilla.  EOMI.  No 

submandibular or sub-lingual space swelling.  No airway narrowing or 

obstruction.


Neck: supple, nontender, trachea midline.


Lungs: No respiratory distress.


Heart: RRR


Extremities: Atraumatic. Neurovascularly intact.


Neuro: Awake, alert, oriented. Neuro Exam nonfocal.


Skin: warm, dry, no lesions





Diagnostics:


[]





Therapeutics:


[]





MDM: 





Impression: 


[]





Plan:


[]





Definitive disposition and diagnosis as appropriate pending reevaluation and 

review of above.








  ** Left dental


Pain Score (Numeric/FACES): 5





- Related Data


                                    Allergies











Allergy/AdvReac Type Severity Reaction Status Date / Time


 


amoxicillin [Amoxicillin] Allergy  Hives Verified 20 11:00


 


codeine Allergy  Nausea Verified 20 11:00


 


hydrocodone Allergy  Nausea Verified 20 11:00


 


Penicillins Allergy  Hives Verified 20 11:00











Home Meds: 


                                    Home Meds





Clindamycin HCl 150 mg PO QID 20 [History]


Escitalopram [Lexapro] 10 mg PO DAILY 20 [History]











Past Medical History





- Past Health History


Medical/Surgical History: Denies Medical/Surgical History


OB/GYN History: Reports: Pregnancy, Spontaneous , Other (See Below)


Other OB/GYN History: D&C 2014 and 2017


Psychiatric History: Reports: Anxiety





- Infectious Disease History


Infectious Disease History: Reports: None





- Past Surgical History


HEENT Surgical History: Reports: Other (See Below)


Other HEENT Surgeries/Procedures: Neck Surgery 2011


Female  Surgical History: Reports: D&C





Social & Family History





- Family History


Family Medical History: Noncontributory





- Tobacco Use


Smoking Status *Q: Never Smoker





- Caffeine Use


Caffeine Use: Reports: Coffee, Soda, Tea





ED ROS GENERAL





- Review of Systems


Review Of Systems: See Below (See HPI)





ED EXAM, GENERAL





- Physical Exam


Exam: See Below (see HPI)





Course





- Vital Signs


Text/Narrative:: 





Left-sided dental pain, already on clindamycin per dentist.  No signs of abscess

 or facial infection.  Pain much improved after local anesthesia via dental ball

 and IM Toradol.  Will discharge patient.  Discussed good pain control.


Last Recorded V/S: 


                                Last Vital Signs











Temp  98.3 F   20 13:29


 


Pulse  85   20 13:29


 


Resp  16   20 13:29


 


BP  125/82   20 13:29


 


Pulse Ox  98   20 13:29














- Orders/Labs/Meds


Meds: 


Medications














Discontinued Medications














Generic Name Dose Route Start Last Admin





  Trade Name Leland  PRN Reason Stop Dose Admin


 


Benzocaine  2 each  20 11:08  20 11:28





  Hurricaine One 20%  MUCMEM  20 11:09  2 each





  ONETIME ONE   Administration


 


Ketorolac Tromethamine  30 mg  20 13:16  20 13:28





  Toradol  IM  20 13:17  30 mg





  ONETIME ONE   Administration


 


Lidocaine HCl  15 ml  20 11:08  20 11:28





  Xylocaine 2% Viscous  PO  20 11:09  15 ml





  ONETIME ONE   Administration














- Re-Assessments/Exams


Free Text/Narrative Re-Assessment/Exam: 





20 13:24


Reassessment, the patient reports she has not had any improvement in pain after 

the dental ball placement.  Therefore will give IM Toradol.


20 13:39


The patient is now feeling much better after receiving the IM Toradol.  She 

would like to be discharged.  Discussed need for follow-up care with the dentist

 and continue clindamycin.











Departure





- Departure


Time of Disposition: 13:39


Disposition: Home, Self-Care 01


Clinical Impression: 


 Dental infection








- Discharge Information


Instructions:  Preventive Dental Care, Adult


Referrals: 


Callie Linn DO [Primary Care Provider] - 


Forms:  ED Department Discharge


Additional Instructions: 


Please follow-up with your dentist as soon as possible for reevaluation.  Return

to the emergency department if you develop any fever or chills.


Please continue to take the clindamycin until all pills are done.





The following information is given to patients seen in the emergency department 

who are being discharged to home. This information is to outline your options 

for follow-up care. We provide all patients seen in our emergency department 

with a follow-up referral.





The need for follow-up, as well as the timing and circumstances, are variable 

depending upon the specifics of your emergency department visit.





If you don't have a primary care physician on staff, we will provide you with a 

referral. We always advise you to contact your personal physician following an 

emergency department visit to inform them of the circumstance of the visit and 

for follow-up with them and/or the need for any referrals to a consulting s

pecialist.





The emergency department will also refer you to a specialist when appropriate. 

This referral assures that you have the opportunity for follow-up care with a 

specialist. All of these measure are taken in an effort to provide you with 

optimal care, which includes your follow-up.





Under all circumstances we always encourage you to contact your private 

physician who remains a resource for coordinating your care. When calling for 

follow-up care, please make the office aware that this follow-up is from your 

recent emergency room visit. If for any reason you are refused follow-up, please

contact the [facility] Emergency Department

at [phone] and asked to speak to the emergency department charge nurse.











Sepsis Event Note (ED)





- Evaluation


Sepsis Screening Result: No Definite Risk